# Patient Record
Sex: FEMALE | Race: BLACK OR AFRICAN AMERICAN | NOT HISPANIC OR LATINO | Employment: FULL TIME | URBAN - METROPOLITAN AREA
[De-identification: names, ages, dates, MRNs, and addresses within clinical notes are randomized per-mention and may not be internally consistent; named-entity substitution may affect disease eponyms.]

---

## 2020-06-03 ENCOUNTER — TRANSCRIBE ORDERS (OUTPATIENT)
Dept: ADMINISTRATIVE | Facility: HOSPITAL | Age: 30
End: 2020-06-03

## 2020-06-03 DIAGNOSIS — R79.89 ELEVATED PROCALCITONIN: Primary | ICD-10-CM

## 2020-06-10 ENCOUNTER — APPOINTMENT (OUTPATIENT)
Dept: MRI IMAGING | Facility: HOSPITAL | Age: 30
End: 2020-06-10

## 2020-06-11 ENCOUNTER — HOSPITAL ENCOUNTER (OUTPATIENT)
Dept: MRI IMAGING | Facility: HOSPITAL | Age: 30
Discharge: HOME | End: 2020-06-11
Admitting: OBSTETRICS & GYNECOLOGY

## 2020-06-11 DIAGNOSIS — R79.89 ELEVATED PROCALCITONIN: ICD-10-CM

## 2020-06-11 PROCEDURE — 70553 MRI BRAIN STEM W/O & W/DYE: CPT

## 2020-06-11 PROCEDURE — A9577 INJ MULTIHANCE: HCPCS | Performed by: OBSTETRICS & GYNECOLOGY

## 2020-06-11 PROCEDURE — 0 GADOBENATE DIMEGLUMINE 529 MG/ML SOLUTION: Performed by: OBSTETRICS & GYNECOLOGY

## 2020-06-11 RX ADMIN — GADOBENATE DIMEGLUMINE 20 ML: 529 INJECTION, SOLUTION INTRAVENOUS at 19:38

## 2020-06-17 ENCOUNTER — OFFICE VISIT (OUTPATIENT)
Dept: INTERNAL MEDICINE | Facility: CLINIC | Age: 30
End: 2020-06-17

## 2020-06-17 VITALS
HEART RATE: 66 BPM | HEIGHT: 64 IN | WEIGHT: 244.4 LBS | SYSTOLIC BLOOD PRESSURE: 120 MMHG | BODY MASS INDEX: 41.73 KG/M2 | OXYGEN SATURATION: 99 % | DIASTOLIC BLOOD PRESSURE: 82 MMHG

## 2020-06-17 DIAGNOSIS — D35.2 PITUITARY MICROADENOMA WITH HYPERPROLACTINEMIA (HCC): ICD-10-CM

## 2020-06-17 DIAGNOSIS — E22.9 PITUITARY MICROADENOMA WITH HYPERPROLACTINEMIA (HCC): ICD-10-CM

## 2020-06-17 DIAGNOSIS — Z00.00 ANNUAL PHYSICAL EXAM: Primary | ICD-10-CM

## 2020-06-17 PROBLEM — L30.9 CHRONIC ECZEMA: Status: ACTIVE | Noted: 2020-06-17

## 2020-06-17 PROCEDURE — 93000 ELECTROCARDIOGRAM COMPLETE: CPT | Performed by: NURSE PRACTITIONER

## 2020-06-17 PROCEDURE — 99385 PREV VISIT NEW AGE 18-39: CPT | Performed by: NURSE PRACTITIONER

## 2020-06-17 RX ORDER — VITAMIN B COMPLEX
TABLET ORAL
COMMUNITY
Start: 2020-06-04 | End: 2020-07-15

## 2020-06-17 RX ORDER — LEVOTHYROXINE SODIUM 0.05 MG/1
TABLET ORAL
COMMUNITY
Start: 2020-06-04 | End: 2020-07-15

## 2020-06-17 NOTE — PATIENT INSTRUCTIONS

## 2020-06-17 NOTE — PROGRESS NOTES
Subjective   Gina Fowler is a 29 y.o. female.     .Well Adult Physical   Patient here for a comprehensive physical exam.The patient reports no problems     History:  LMP: No LMP recorded. IUD placed on 2020, -2020   Last pap date: 2020  Abnormal pap? not asked  : 0  Para: 0    She is here to establish care. She is currently at [employer] working on PhD in pan  studies.  She is exercising at this time (walking/running-1 mile a day-weight vest). She is up to date with dental and vision exam. Her diet is improving-more fruits/veggies and leaner meat back in 2019.     She presents today secondary to recent elevated prolactin level and abnormal MRI of pituitary that was performed by Dr Jimenez. She had normal pap on 2020 and had significant lab work after complaining menses period in the month of May. She had an IUD placed during this office visit too. She was also started on levothyroxine and vitamin D based on lab work (she does not have with her). She reports Dr Jimenez was arranging an appointment with neurosurgery for evaluation of a 3 mm tiny focus on the left posterior aspect of the anterior lobe of the pituitary. She is awaiting an update.     She also makes mention that in being told that her right ureter was abnormal while living in [place], [place] in  She has not had any follow up since then.         The following portions of the patient's history were reviewed and updated as appropriate: allergies, current medications, past family history, past medical history, past social history, past surgical history and problem list.    Review of Systems   Constitutional: Negative for appetite change, chills, fatigue and fever.   HENT: Negative for congestion, ear pain, hearing loss, mouth sores, nosebleeds, postnasal drip, rhinorrhea, sinus pressure, sneezing, sore throat, tinnitus, trouble swallowing and voice change.    Eyes: Negative for visual  disturbance.   Respiratory: Negative for cough, chest tightness, shortness of breath and wheezing.    Cardiovascular: Negative for chest pain, palpitations and leg swelling.   Gastrointestinal: Negative for abdominal pain, anal bleeding, blood in stool, constipation, diarrhea, nausea and vomiting.        Mild reflux with tomatoes   Endocrine: Negative for cold intolerance, heat intolerance, polydipsia, polyphagia and polyuria.   Genitourinary: Negative for dysuria, frequency, hematuria and urgency.   Musculoskeletal: Positive for arthralgias (left ankle, chronic ). Negative for back pain, gait problem, joint swelling, myalgias, neck pain and neck stiffness.   Skin: Negative for color change and rash.        NEGATIVE BREAST MASS, BREAST PAIN, , SKIN CHANGES, OR LUMP IN ARMPIT    Nipple discharge intermittently,   Hx of eczema      Neurological: Positive for numbness (intermittent, left hand, chronic, since 2014 ). Negative for dizziness, tremors, seizures, syncope, speech difficulty, weakness and headaches.   Hematological: Negative for adenopathy. Does not bruise/bleed easily.   Psychiatric/Behavioral: Negative for behavioral problems, confusion, decreased concentration, sleep disturbance and suicidal ideas. The patient is not nervous/anxious.        Objective   Physical Exam   Constitutional: She appears well-developed and well-nourished.   HENT:   Right Ear: Hearing and external ear normal.   Left Ear: Hearing and external ear normal.   Nose: Nose normal. Right sinus exhibits no maxillary sinus tenderness and no frontal sinus tenderness. Left sinus exhibits no maxillary sinus tenderness and no frontal sinus tenderness.   Mouth/Throat: Uvula is midline, oropharynx is clear and moist and mucous membranes are normal. No tonsillar exudate.   Excessive cerumen in bilateral ears    Eyes: Pupils are equal, round, and reactive to light. Conjunctivae, EOM and lids are normal.   Neck: Trachea normal. Neck supple. No JVD  present. Carotid bruit is not present. No tracheal deviation present. No thyroid mass and no thyromegaly present.   Cardiovascular: Normal rate, regular rhythm, S1 normal and S2 normal. Exam reveals no gallop and no friction rub.   No murmur heard.  Pulses:       Carotid pulses are 2+ on the right side, and 2+ on the left side.       Radial pulses are 2+ on the right side, and 2+ on the left side.        Dorsalis pedis pulses are 2+ on the right side, and 2+ on the left side.        Posterior tibial pulses are 2+ on the right side, and 2+ on the left side.   No pedal edema    Pulmonary/Chest: Effort normal and breath sounds normal. Chest wall is not dull to percussion.   Abdominal: Soft. Normal aorta and bowel sounds are normal. She exhibits no abdominal bruit. There is no tenderness. There is no rebound and no guarding. No hernia.   Obese abdomen    Musculoskeletal: Normal range of motion. She exhibits no edema.   (-) SLR   Crepitus in left ankle    Lymphadenopathy:        Head (right side): No submental, no submandibular, no tonsillar, no preauricular, no posterior auricular and no occipital adenopathy present.        Head (left side): No submental, no submandibular, no tonsillar, no preauricular, no posterior auricular and no occipital adenopathy present.     She has no cervical adenopathy.        Right: No supraclavicular adenopathy present.        Left: No supraclavicular adenopathy present.   Neurological: She is alert. She has normal strength. No cranial nerve deficit or sensory deficit. She displays a negative Romberg sign.   Reflex Scores:       Patellar reflexes are 2+ on the right side and 2+ on the left side.  Skin: Skin is warm and dry. No rash noted.   Multiple tattoos    Psychiatric: She has a normal mood and affect. Her speech is normal and behavior is normal. Judgment and thought content normal. Cognition and memory are normal.   Nursing note and vitals reviewed.      Assessment/Plan   Gina was  seen today for establish care.    Diagnoses and all orders for this visit:    Annual physical exam  -     ECG 12 Lead    Pituitary microadenoma with hyperprolactinemia (CMS/HCC)  Comments:  due to see specialist       Will obtain lab work from Dr Jimenez and adjust care if warranted  She was advised to monthly breast exams  Needs routine exercise (cardio 30-45 minutes 3-4 times a week ) and healthy diet.     ECG 12 Lead  Date/Time: 6/17/2020 1:45 PM  Performed by: Dione Martínez APRN  Authorized by: Dione Martínez APRN   Previous ECG: no previous ECG available  Rhythm: sinus rhythm  Rate: normal  Conduction: conduction normal  ST Segments: ST segments normal  T Waves: T waves normal  QRS axis: normal    Clinical impression: normal ECG             Answers for HPI/ROS submitted by the patient on 6/17/2020   What is the primary reason for your visit?: Other  Please describe your symptoms.: N/A  Have you had these symptoms before?: No  How long have you been having these symptoms?: 1-4 days  Please list any medications you are currently taking for this condition.: N/A  Please describe any probable cause for these symptoms. : N/A

## 2020-06-23 DIAGNOSIS — E55.9 VITAMIN D DEFICIENCY: Primary | ICD-10-CM

## 2020-06-23 RX ORDER — ERGOCALCIFEROL 1.25 MG/1
50000 CAPSULE ORAL WEEKLY
Qty: 12 CAPSULE | Refills: 0 | Status: SHIPPED | OUTPATIENT
Start: 2020-06-23

## 2020-07-15 ENCOUNTER — OFFICE VISIT (OUTPATIENT)
Dept: INTERNAL MEDICINE | Facility: CLINIC | Age: 30
End: 2020-07-15

## 2020-07-15 VITALS
HEIGHT: 64 IN | BODY MASS INDEX: 41.66 KG/M2 | DIASTOLIC BLOOD PRESSURE: 80 MMHG | HEART RATE: 81 BPM | WEIGHT: 244 LBS | OXYGEN SATURATION: 100 % | SYSTOLIC BLOOD PRESSURE: 118 MMHG

## 2020-07-15 DIAGNOSIS — R79.89 ABNORMAL THYROID BLOOD TEST: ICD-10-CM

## 2020-07-15 DIAGNOSIS — D35.2 PITUITARY MICROADENOMA WITH HYPERPROLACTINEMIA (HCC): Primary | ICD-10-CM

## 2020-07-15 DIAGNOSIS — E55.9 VITAMIN D DEFICIENCY: ICD-10-CM

## 2020-07-15 DIAGNOSIS — Z13.220 SCREENING FOR CHOLESTEROL LEVEL: ICD-10-CM

## 2020-07-15 DIAGNOSIS — E22.9 PITUITARY MICROADENOMA WITH HYPERPROLACTINEMIA (HCC): Primary | ICD-10-CM

## 2020-07-15 PROCEDURE — 99213 OFFICE O/P EST LOW 20 MIN: CPT | Performed by: NURSE PRACTITIONER

## 2020-07-15 NOTE — PROGRESS NOTES
Subjective   Gina Fowler is a 29 y.o. female.     She followed with gyn last week for IUD placement. She has yet to here from neurologist regarding pituitary adenoma. She denies any complaints today. She started keto diet last week.        The following portions of the patient's history were reviewed and updated as appropriate: allergies, current medications, past social history, past surgical history and problem list.    Review of Systems   Constitutional: Negative for activity change, appetite change, fatigue and fever.        Overall doing ok    Eyes: Negative for visual disturbance.   Respiratory: Negative for cough, shortness of breath and wheezing.    Cardiovascular: Negative for chest pain, palpitations and leg swelling.   Skin:        No hair changes.   No night sweats    Neurological: Negative for dizziness, light-headedness and headaches.       Objective   Physical Exam   Constitutional: She is oriented to person, place, and time. She appears well-developed and well-nourished.   HENT:   Head: Normocephalic.   Nose: Nose normal.   Cardiovascular: Regular rhythm and normal heart sounds. Exam reveals no S3 and no S4.   No murmur heard.  Pulmonary/Chest: Effort normal and breath sounds normal. She has no decreased breath sounds. She has no wheezes. She has no rhonchi. She has no rales.   Musculoskeletal: She exhibits no edema.   Neurological: She is alert and oriented to person, place, and time. Gait normal.   Skin: Skin is warm and dry.   Psychiatric: She has a normal mood and affect.       Assessment/Plan   Gina was seen today for follow-up.    Diagnoses and all orders for this visit:    Pituitary microadenoma with hyperprolactinemia (CMS/HCC)  -     Ambulatory Referral to Neurosurgery  -     Comprehensive Metabolic Panel    Vitamin D deficiency  -     Vitamin D 25 Hydroxy; Future    Abnormal thyroid blood test  -     T3, Free; Future  -     T4, Free; Future  -     TSH; Future    Screening for cholesterol  level  -     Lipid Panel    She will return in 4 weeks for recheck of Vitamin D and thyroid levels            Answers for HPI/ROS submitted by the patient on 7/8/2020   What is the primary reason for your visit?: Other  Please describe your symptoms.: Follow up  Have you had these symptoms before?: No  How long have you been having these symptoms?: 1-4 days  Please list any medications you are currently taking for this condition.: None  Please describe any probable cause for these symptoms. : None

## 2020-07-15 NOTE — PROGRESS NOTES
Answers for HPI/ROS submitted by the patient on 7/8/2020   What is the primary reason for your visit?: Other  Please describe your symptoms.: Follow up  Have you had these symptoms before?: No  How long have you been having these symptoms?: 1-4 days  Please list any medications you are currently taking for this condition.: None  Please describe any probable cause for these symptoms. : None

## 2020-07-16 LAB
ALBUMIN SERPL-MCNC: 4 G/DL (ref 3.5–5.2)
ALBUMIN/GLOB SERPL: 1.3 G/DL
ALP SERPL-CCNC: 71 U/L (ref 39–117)
ALT SERPL-CCNC: 12 U/L (ref 1–33)
AST SERPL-CCNC: 13 U/L (ref 1–32)
BILIRUB SERPL-MCNC: 0.2 MG/DL (ref 0–1.2)
BUN SERPL-MCNC: 9 MG/DL (ref 6–20)
BUN/CREAT SERPL: 10.7 (ref 7–25)
CALCIUM SERPL-MCNC: 9.2 MG/DL (ref 8.6–10.5)
CHLORIDE SERPL-SCNC: 104 MMOL/L (ref 98–107)
CHOLEST SERPL-MCNC: 198 MG/DL (ref 0–200)
CO2 SERPL-SCNC: 25.4 MMOL/L (ref 22–29)
CREAT SERPL-MCNC: 0.84 MG/DL (ref 0.57–1)
GLOBULIN SER CALC-MCNC: 3.2 GM/DL
GLUCOSE SERPL-MCNC: 88 MG/DL (ref 65–99)
HDLC SERPL-MCNC: 35 MG/DL (ref 40–60)
LDLC SERPL CALC-MCNC: 124 MG/DL (ref 0–100)
POTASSIUM SERPL-SCNC: 4.7 MMOL/L (ref 3.5–5.2)
PROT SERPL-MCNC: 7.2 G/DL (ref 6–8.5)
SODIUM SERPL-SCNC: 138 MMOL/L (ref 136–145)
TRIGL SERPL-MCNC: 196 MG/DL (ref 0–150)
VLDLC SERPL CALC-MCNC: 39.2 MG/DL

## 2020-07-28 ENCOUNTER — TELEPHONE (OUTPATIENT)
Dept: NEUROSURGERY | Facility: CLINIC | Age: 30
End: 2020-07-28

## 2020-07-28 NOTE — PROGRESS NOTES
Subjective   History of Present Illness: Gina Fowler is a 29 y.o. female is being seen for consultation today at the request of JANE Calvert for Pituitary microadenoma with hyperprolactinemia. MRI Putuitary 6/11/20 at [facility]. Today Ms. Fowler reports that she is doing well overall. She c/o of waking up at night between 1:00am and 3:00am feeling very hot. She denies diaphoresis during this time. She denies fatigue, dizziness, light-headedness, headaches, visual changes. She denies issues with balance or gait. She reports that in late 2018 early 2019 she had an episode where she started lactating. She does not have any children and has never been pregnant. 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  Reports that she did miss 1 period in may.  She also reports gaining weight over the past 3 to 4 years.    History of Present Illness    The following portions of the patient's history were reviewed and updated as appropriate: allergies, past family history, past medical history, past social history, past surgical history and problem list.    History reviewed. No pertinent past medical history.     Past Surgical History:   Procedure Laterality Date   • WISDOM TOOTH EXTRACTION            Current Outpatient Medications:   •  levonorgestrel (Kyleena) 19.5 MG intrauterine device IUD, , Disp: , Rfl:   •  ST JOHNS WORT MOOD RELAXER PO, Take 1 tablet by mouth Daily., Disp: , Rfl:   •  vitamin D (ERGOCALCIFEROL) 1.25 MG (26977 UT) capsule capsule, Take 1 capsule by mouth 1 (One) Time Per Week., Disp: 12 capsule, Rfl: 0     Social History     Socioeconomic History   • Marital status: Single     Spouse name: Not on file   • Number of children: Not on file   • Years of education: Not on file   • Highest education level: Not on file   Tobacco Use   • Smoking status: Never Smoker   • Smokeless tobacco: Never Used   Substance and Sexual Activity   • Alcohol use: Yes     Alcohol/week:  "3.0 standard drinks     Types: 3 Glasses of wine per week   • Drug use: Never   • Sexual activity: Yes     Partners: Male     Birth control/protection: IUD        Family History   Problem Relation Age of Onset   • Lung cancer Maternal Aunt    • Lung cancer Maternal Aunt         Review of Systems   Constitutional: Negative for chills, diaphoresis, fatigue and fever.   HENT: Negative for ear pain and trouble swallowing.    Eyes: Negative for pain and visual disturbance.   Respiratory: Negative for shortness of breath.    Cardiovascular: Negative for chest pain and palpitations.   Gastrointestinal: Negative for abdominal pain, nausea and vomiting.   Genitourinary: Negative for difficulty urinating and frequency.   Musculoskeletal: Negative for back pain, gait problem and neck pain.   Neurological: Negative for dizziness, syncope, weakness, light-headedness and headaches.   Psychiatric/Behavioral: Positive for sleep disturbance (wakes up between 1am-3am and feels hot). Negative for confusion and decreased concentration.   I have reviewed and confirmed the accuracy of the ROS as documented by the MA/LPN/RN Ramirez Mendoza MD      Objective     Vitals:    07/29/20 1100   BP: 116/74   Pulse: 68   Temp: 97.5 °F (36.4 °C)   Weight: 112 kg (247 lb 12.8 oz)   Height: 163.8 cm (64.5\")     Body mass index is 41.88 kg/m².      Physical Exam  Neurologic Exam  Awake, alert, oriented x3  Pupils equal round reactive to light  Extraocular muscles intact  Face symmetric  Speech is fluent and clear  No pronator drift  Motor exam  Bilateral deltoids 5/5, bilateral biceps 5/5, bilateral triceps 5/5, bilateral wrist extension 5/5 bilateral hand intrinsics 5/5  Bilateral hip flexion 5/5, bilateral knee extension 5/5, bilateral DF/PF 5/5  No clonus  No Andrew's reflex  2+ bilateral patellar reflexes  2+ bilateral biceps reflex  Steady normal gait  Able to walk heel-to-toe without difficulty  Able to detect  light touch in all 4 " extremities    Independent Review of Radiographic Studies:      I personally reviewed the images from the following studies.  MRI pituitary with and without contrast.  Demonstrate a possible macroadenoma.  The lesion is very faint and appears as a T2 hypointensity on the right posterior aspect of the pituitary gland.  The lesion is currently very small and without any significant mass-effect on surrounding structures.      Assessment/Plan   29-year-old female with 3 mm pituitary lesion and report of elevated prolactin levels    Medical Decision Making:    I reviewed the chart and I am able to find her prolactin level.  She reports that she recently had the labs drawn and it was found to be elevated.  She reports an episode of lactation about 2 years ago as well as missing her period 1 month ago.  She reports episodes of hot flashes at night.  These findings are concerning for a possible prolactin secreting microadenoma.    -We will consult endocrinology for further work-up and evaluation for medical management.  If the adenoma is prolactin secreting and unable to be managed with medication will be available for endonasal resection of the lesion.   -I will schedule a follow-up in 2 to 3 months with a repeat MRI to evaluate for any growth of the lesion  -He was counseled on symptoms that can develop with growth pituitary lesion such as worsening or more frequent headaches or visual problems.  He was told to call our office if any of these symptoms develop.    Gina was seen today for pituitary microadenoma with hyperprolactinemia.    Diagnoses and all orders for this visit:    Pituitary adenoma (CMS/HCC)  -     MRI Brain With & Without Contrast; Future  -     Ambulatory Referral to Endocrinology      Return in about 3 months (around 10/29/2020).

## 2020-07-28 NOTE — TELEPHONE ENCOUNTER
Spoke w/Geronimo at [facility]. This is a [employer] student plan. Patient is covered through 07/31/2020. If student re-enrolls coverage will begin on 08/01/2020.

## 2020-07-29 ENCOUNTER — OFFICE VISIT (OUTPATIENT)
Dept: NEUROSURGERY | Facility: CLINIC | Age: 30
End: 2020-07-29

## 2020-07-29 ENCOUNTER — LAB (OUTPATIENT)
Dept: INTERNAL MEDICINE | Facility: CLINIC | Age: 30
End: 2020-07-29

## 2020-07-29 VITALS
SYSTOLIC BLOOD PRESSURE: 116 MMHG | HEIGHT: 65 IN | BODY MASS INDEX: 41.29 KG/M2 | HEART RATE: 68 BPM | WEIGHT: 247.8 LBS | TEMPERATURE: 97.5 F | DIASTOLIC BLOOD PRESSURE: 74 MMHG

## 2020-07-29 DIAGNOSIS — R79.89 ABNORMAL THYROID BLOOD TEST: ICD-10-CM

## 2020-07-29 DIAGNOSIS — D35.2 PITUITARY ADENOMA (HCC): Primary | ICD-10-CM

## 2020-07-29 DIAGNOSIS — E55.9 VITAMIN D DEFICIENCY: ICD-10-CM

## 2020-07-29 PROCEDURE — 99202 OFFICE O/P NEW SF 15 MIN: CPT | Performed by: NEUROLOGICAL SURGERY

## 2020-07-30 LAB
25(OH)D3+25(OH)D2 SERPL-MCNC: 33.5 NG/ML (ref 30–100)
T3FREE SERPL-MCNC: 2.6 PG/ML (ref 2–4.4)
T4 FREE SERPL-MCNC: 1 NG/DL (ref 0.93–1.7)
TSH SERPL DL<=0.005 MIU/L-ACNC: 1.27 UIU/ML (ref 0.27–4.2)

## 2020-11-05 ENCOUNTER — APPOINTMENT (OUTPATIENT)
Dept: MRI IMAGING | Facility: HOSPITAL | Age: 30
End: 2020-11-05

## 2021-04-16 ENCOUNTER — BULK ORDERING (OUTPATIENT)
Dept: CASE MANAGEMENT | Facility: OTHER | Age: 31
End: 2021-04-16

## 2021-04-16 DIAGNOSIS — Z23 IMMUNIZATION DUE: ICD-10-CM
